# Patient Record
Sex: MALE | Race: WHITE | NOT HISPANIC OR LATINO | URBAN - METROPOLITAN AREA
[De-identification: names, ages, dates, MRNs, and addresses within clinical notes are randomized per-mention and may not be internally consistent; named-entity substitution may affect disease eponyms.]

---

## 2018-12-26 ENCOUNTER — EMERGENCY (EMERGENCY)
Facility: HOSPITAL | Age: 23
LOS: 1 days | Discharge: HOME | End: 2018-12-26
Attending: EMERGENCY MEDICINE
Payer: COMMERCIAL

## 2018-12-26 VITALS
DIASTOLIC BLOOD PRESSURE: 89 MMHG | HEART RATE: 89 BPM | TEMPERATURE: 98 F | OXYGEN SATURATION: 98 % | HEIGHT: 72 IN | SYSTOLIC BLOOD PRESSURE: 160 MMHG | WEIGHT: 169.98 LBS | RESPIRATION RATE: 18 BRPM

## 2018-12-26 PROCEDURE — 73562 X-RAY EXAM OF KNEE 3: CPT

## 2018-12-26 PROCEDURE — 99283 EMERGENCY DEPT VISIT LOW MDM: CPT | Mod: 25

## 2018-12-26 PROCEDURE — 99053 MED SERV 10PM-8AM 24 HR FAC: CPT

## 2018-12-26 PROCEDURE — 99283 EMERGENCY DEPT VISIT LOW MDM: CPT

## 2018-12-26 NOTE — ED PROVIDER NOTE - MUSCULOSKELETAL MINIMAL EXAM
LLE: +minimal swelling noted to medial left knee with +overlying ttp along medial joint line and MCL. No patella tenderness. No proximal fibular tenderness. No joint laxity. Negative Lachman's. Negative Malini's. +pain reported with valgus stress. ROM at knee limited 2/2 pain, able to flex leg at knee to about 45 degrees before reporting pain in medial aspect. Able to fully extend. Full AROM at hip and ankle with 5/5 strength.

## 2018-12-26 NOTE — ED PROVIDER NOTE - RESPIRATORY, MLM
Breath sounds clear and equal bilaterally. No wheezing, rales or rhonchi noted. No chest wall tenderness.
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
## 2018-12-26 NOTE — ED PROVIDER NOTE - PLAN OF CARE
1. Follow up with your PMD in the next 1-2 days. Additionally please follow up with either out sports medicine clinic or an orthopedist this week for further outpatient management and possible further workup.  Referral list provided.   2. Rest, ice and elevate knee. Use ACE wrap as instructed to the knee during the day. Take Motrin 600mg every 8 hrs for pain with food. For additional pain relief take Tylenol 650mg 1 tab every 4-6 hours as needed.  3. If you develop any worsening pain, swelling, redness, fever/chills, weakness, numbness/tingling, inability to walk or any other concerning symptoms please return to the ED immediately.

## 2018-12-26 NOTE — ED PROVIDER NOTE - PROGRESS NOTE DETAILS
Pt was offered analgesia at time of initial ED evaluation but refused. Has ice pack in place and wishes to just use that for pain. Will reassess need for analgesia. - KAYLIE WetzelC xray negative for fx or dislocation. Small knee joint effusion. Offered patient ACE wrap vs knee immobilizer and he requested ace wrap. offered crutches to weight bear as tolerated but refused crutches. Will d/c with ortho follow up. Attending cleared for discharge. - Juanjose Colunga PA-C
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
## 2018-12-26 NOTE — ED PROVIDER NOTE - NSFOLLOWUPCLINICS_GEN_ALL_ED_FT
[facility] Sports Medicine  Sports Medicine  [address] [place] [address]  Phone: [phone]  Fax:   Follow Up Time: 4-6 Days

## 2018-12-26 NOTE — ED PROVIDER NOTE - CARE PLAN
Principal Discharge DX:	Knee effusion, left  Assessment and plan of treatment:	1. Follow up with your PMD in the next 1-2 days. Additionally please follow up with either out sports medicine clinic or an orthopedist this week for further outpatient management and possible further workup.  Referral list provided.   2. Rest, ice and elevate knee. Use ACE wrap as instructed to the knee during the day. Take Motrin 600mg every 8 hrs for pain with food. For additional pain relief take Tylenol 650mg 1 tab every 4-6 hours as needed.  3. If you develop any worsening pain, swelling, redness, fever/chills, weakness, numbness/tingling, inability to walk or any other concerning symptoms please return to the ED immediately.

## 2018-12-27 PROCEDURE — 73562 X-RAY EXAM OF KNEE 3: CPT | Mod: 26,LT
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
## 2018-12-27 NOTE — ED ADULT NURSE NOTE - CHPI ED NUR SYMPTOMS NEG
no decreased eating/drinking/no fussiness/no disorientation/no dizziness/no sleeping issues/no difficulty bearing weight/no back pain/no bruising/no acting out behaviors/no headache/no loss of consciousness/no neck tenderness/no crying/no laceration

## 2023-04-18 ENCOUNTER — EMERGENCY (EMERGENCY)
Facility: HOSPITAL | Age: 28
LOS: 1 days | Discharge: HOME | End: 2023-04-18
Payer: COMMERCIAL

## 2023-04-18 VITALS
WEIGHT: 179.9 LBS | RESPIRATION RATE: 18 BRPM | OXYGEN SATURATION: 98 % | SYSTOLIC BLOOD PRESSURE: 141 MMHG | HEIGHT: 71 IN | DIASTOLIC BLOOD PRESSURE: 83 MMHG | TEMPERATURE: 98 F | HEART RATE: 98 BPM

## 2023-04-18 PROCEDURE — 99284 EMERGENCY DEPT VISIT MOD MDM: CPT

## 2023-04-18 NOTE — ED ADULT TRIAGE NOTE - CHIEF COMPLAINT QUOTE
trauma to left eye tonight, hit with soccer ball. Endorses left eye "went black" denies visual changes in triage. Denies dizziness, headache.

## 2023-04-19 VITALS
DIASTOLIC BLOOD PRESSURE: 68 MMHG | HEART RATE: 66 BPM | TEMPERATURE: 98 F | RESPIRATION RATE: 18 BRPM | OXYGEN SATURATION: 99 % | SYSTOLIC BLOOD PRESSURE: 122 MMHG

## 2023-04-19 PROCEDURE — 99284 EMERGENCY DEPT VISIT MOD MDM: CPT | Mod: 25

## 2023-04-19 PROCEDURE — 96374 THER/PROPH/DIAG INJ IV PUSH: CPT

## 2023-04-19 PROCEDURE — 70481 CT ORBIT/EAR/FOSSA W/DYE: CPT | Mod: 26,MA

## 2023-04-19 PROCEDURE — 96375 TX/PRO/DX INJ NEW DRUG ADDON: CPT

## 2023-04-19 PROCEDURE — 70481 CT ORBIT/EAR/FOSSA W/DYE: CPT | Mod: MA

## 2023-04-19 RX ORDER — ACETAMINOPHEN 500 MG
1000 TABLET ORAL ONCE
Refills: 0 | Status: COMPLETED | OUTPATIENT
Start: 2023-04-19 | End: 2023-04-19

## 2023-04-19 RX ORDER — ONDANSETRON 8 MG/1
4 TABLET, FILM COATED ORAL ONCE
Refills: 0 | Status: COMPLETED | OUTPATIENT
Start: 2023-04-19 | End: 2023-04-19

## 2023-04-19 RX ORDER — SODIUM CHLORIDE 9 MG/ML
1000 INJECTION INTRAMUSCULAR; INTRAVENOUS; SUBCUTANEOUS ONCE
Refills: 0 | Status: COMPLETED | OUTPATIENT
Start: 2023-04-19 | End: 2023-04-19

## 2023-04-19 RX ORDER — PREDNISOLONE SODIUM PHOSPHATE 1 %
1 DROPS OPHTHALMIC (EYE) ONCE
Refills: 0 | Status: DISCONTINUED | OUTPATIENT
Start: 2023-04-19 | End: 2023-04-22

## 2023-04-19 RX ORDER — CYCLOPENTOLATE HYDROCHLORIDE 10 MG/ML
1 SOLUTION/ DROPS OPHTHALMIC
Qty: 1 | Refills: 0
Start: 2023-04-19 | End: 2023-04-23

## 2023-04-19 RX ORDER — CYCLOPENTOLATE HYDROCHLORIDE 10 MG/ML
1 SOLUTION/ DROPS OPHTHALMIC ONCE
Refills: 0 | Status: DISCONTINUED | OUTPATIENT
Start: 2023-04-19 | End: 2023-04-22

## 2023-04-19 RX ORDER — PREDNISOLONE SODIUM PHOSPHATE 1 %
1 DROPS OPHTHALMIC (EYE)
Qty: 1 | Refills: 0
Start: 2023-04-19 | End: 2023-04-23

## 2023-04-19 RX ADMIN — ONDANSETRON 4 MILLIGRAM(S): 8 TABLET, FILM COATED ORAL at 03:15

## 2023-04-19 RX ADMIN — Medication 400 MILLIGRAM(S): at 03:05

## 2023-04-19 RX ADMIN — SODIUM CHLORIDE 1000 MILLILITER(S): 9 INJECTION INTRAMUSCULAR; INTRAVENOUS; SUBCUTANEOUS at 03:15

## 2023-04-19 RX ADMIN — Medication 1000 MILLIGRAM(S): at 04:00

## 2023-04-19 NOTE — ED PROVIDER NOTE - PHYSICAL EXAMINATION
Paul Gotti DO (PGY1)   Physical Exam:    Gen: NAD, AOx3  Head: NCAT  HEENT: EOMI, PEERLA, Visual Acuity: OS 20/50, OD 20/70 uncorrected, large corneal abrasion with injected cornea, elongated left pupil, negative Goyo's sign. Pressure in right eye 14, left eye 13.   Lung: CTAB, no respiratory distress, no wheezes/rhonchi/rales B/L  CV: RRR, no murmurs, rubs or gallops  Abd: soft, NT, ND, no guarding, no rigidity, no rebound tenderness, no CVA tenderness   MSK: no visible deformities, ROM normal in UE/LE, no back pain  Neuro: No focal sensory or motor deficits  Skin: Warm, well perfused, no rash, no leg swelling

## 2023-04-19 NOTE — ED PROVIDER NOTE - NSFOLLOWUPINSTRUCTIONS_ED_ALL_ED_FT
1. Follow up with your PCP within 2-3 days. Follow up with [facility] Department of Ophthalmology within 1 week of after discharge at:    [address]  [place] [address]  [phone]    2. Start taking Pred Forte 1 drop four times a day to left eye for 5 days. Start taking Cyclogyl 1 drop twice daily to left eye for 5 days. Use artifical tears for comfort to left eye as directed.     3. Take Ibuprofen (i.e. Motrin, Advil) 600mg every 8 hrs for pain as needed. Take with food.   May alternate with Acetminophen (Tylenol) 650mg every 6 hours for pain as needed.    4. Return to the emergency department if you have worsening pain, difficulty seeing, headaches, fevers or any other concerning symptoms.

## 2023-04-19 NOTE — ED POST DISCHARGE NOTE - ADDITIONAL DOCUMENTATION
Greg PA- pharmacy called, only can get cyclogyl 1% not 0.5%, spoke with ophtho that is ok at the same instructions BID. made the change over the phone with pharmacist.

## 2023-04-19 NOTE — CONSULT NOTE ADULT - ASSESSMENT
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 CVF full. EOM full - no optic nerve dysfunction  - Left pupil irregular, likely secondary to iris sphincter tears  - Cornea clear  - 2+ cell in AC  - DFE wnl  - Start Pred forte qid OS   - Start cyclogyl BID OS  - Artificial tears for comfort PRN    Outpatient Follow-up: Patient should follow-up with his/her ophthalmologist or with [facility] Department of Ophthalmology within 1 week of after discharge at:    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 CVF full. EOM full - no optic nerve dysfunction  - Left pupil irregular, likely secondary to iris sphincter tears  - Cornea clear  - 2+ cell in AC  - DFE wnl  - Start Pred forte qid OS   - Start cyclogyl BID OS  - Artificial tears for comfort PRN    #Commotioe Retinae with retinal hemorrhages OS  - No retinal tears, holes, detachments identified  - Bullous in some areas  -Will refer o retina specialist    Outpatient Follow-up: Patient should follow-up with his/her ophthalmologist or with [facility] Department of Ophthalmology within 1 week of after discharge at:    [address]  [place] [address]  [phone]    Ijeoma Dye MD PGY 2  Available on [employer] Teams

## 2023-04-19 NOTE — ED PROVIDER NOTE - PROGRESS NOTE DETAILS
Paul Gotti DO (PGY1)  Consulted ophthalmology regarding case.  Discussed all findings on eye exam.  Patient still pending CT of the orbits with IV contrast.  We will touch base with ophthalmology once CT read is back. Patient received on sign out. CT scan negative for acute findings. Called and spoke to ophtho they are on their way to see patient in the ED. Mckenzie Krause PA-C

## 2023-04-19 NOTE — ED PROVIDER NOTE - ATTENDING CONTRIBUTION TO CARE
I, Rommel Berrios, performed a history and physical exam of the patient and discussed their management with the resident and/or advanced care provider. I reviewed the resident and/or advanced care provider's note and agree with the documented findings and plan of care. I was present and available for all procedures.    27-year-old male no past medical history presenting with left eye trauma.  Patient states he was playing soccer and was kicked in the left eye with a soccer ball.  Patient endorsing he had vision loss during the event for about 1min No loss of consciousness.  Patient states he wears contacts normally and is was wearing them during the event.  Denies taking any medications prior to arrival. Endorsing nausea and x1 episode of nonbloody nonbilious vomiting. Denies any chest pain, shortness of breath, fevers, chills, abdominal pain, nausea vomiting diarrhea, urinary complaints. va dec now and co nausea    Well appearing and in NAD trachea midline, no respiratory distress, lungs cta bilaterally, rrr no murmurs, soft NT ND abdomen, no visible extremity deformities, Alert and oriented, non focal neuro exam, skin warm and dry, normal affect and mood    1. Visual acuity 20/50 od 20/70 os  2. Pupils with anisocoria, has normal reactivity  3. Extraocular motility and alignment intact pain w Shock demotion to superior and lateral fields of os   4. Intraocular pressure- tonometry 13 OD 15 OS  5. External examination, Slight swelling to superior eyelid with some tenderness palpation of the globe on OS normal OD     Lids/lashes/lacrimal system: Normal anatomy and contours     Conjunctiva/sclera: White and quiet  OD Injection OS     Cornea: Clear       Iris: os pupil appearing slightly distorted from 1-3 oclock      fluorecin stain  os +corneal abrasions, no heriberto sign      Concerning for KEISHA injury with tenderness as well as corneal injection with decreased visual acuity and also anisocoria pupil is reactive and without Heriberto sign but does have corneal abrasion we will treat with antibiotics otherwise evaluate for posterior injury with CT orbits discussed with patient agreeable with plan antiemetics and pain medication as necessary as well as ophthalmology evaluation for further examination and recommendations.  Unlikely retrobulbar hematoma without proptosis or elevated pressures continue to monitor and possible repeat exam if symptoms changing

## 2023-04-19 NOTE — ED PROVIDER NOTE - OBJECTIVE STATEMENT
7-year-old male no past medical history presenting with left eye trauma.  Patient states he was playing soccer and was kicked in the left eye with a ball.  Patient endorsing he had vision loss during the event.  No loss of consciousness.  Patient states he wears contacts normally and is was wearing them during the event.  Denies taking any medications prior to arrival. Endorsing nausea and x1 episode of nonbloody nonbilious vomiting. Denies any chest pain, shortness of breath, fevers, chills, abdominal pain, nausea vomiting diarrhea, urinary complaints.

## 2023-04-19 NOTE — CONSULT NOTE ADULT - SUBJECTIVE AND OBJECTIVE BOX
[facility] DEPARTMENT OF OPHTHALMOLOGY - INITIAL ADULT CONSULT  -----------------------------------------------------------------------------------------------------------------  Ijeoma Dye MD PGY 1  Contact via [employer] Teams  -----------------------------------------------------------------------------------------------------------------    HPI:    Interval History: ***    PAST MEDICAL & SURGICAL HISTORY:    Past Ocular History: ***    Family History:  FAMILY HISTORY:      Social History: ***    Ophthalmic Medications: ***    MEDICATIONS  (STANDING):    MEDICATIONS  (PRN):    Allergies & Intolerances:   amoxicillin (Stomach Upset)    Review of Systems:  Constitutional: No fever, chills  Eyes: No blurry vision, flashes, floaters, FBS, erythema, discharge, double vision, OU  Neuro: No tremors  Cardiovascular: No chest pain, palpitations  Respiratory: No SOB, no cough  GI: No nausea, vomiting, abdominal pain  : No dysuria  Skin: no rash  Psych: no depression  Endocrine: no polyuria, polydipsia  Heme/lymph: no swelling    VITALS: T(C): 36.4 (04-19-23 @ 03:00)  T(F): 97.6 (04-19-23 @ 03:00), Max: 97.7 (04-18-23 @ 23:39)  HR: 60 (04-19-23 @ 03:00) (60 - 98)  BP: 109/60 (04-19-23 @ 03:00) (109/60 - 141/83)  RR:  (16 - 18)  SpO2:  (98% - 100%)  Wt(kg): --  General: AAO x 3, appropriate mood and affect    Ophthalmology Exam:  Visual acuity (sc): 20/20 OD, 20/25 OS.  Pupils: OD: round, reactive. OS: slightly irregular superiorly. reactive. NO rapd  Tonometry:  14 OD, 16 OS  Extraocular movements (EOMs): Full OU, no pain, no diplopia.  Confrontational Visual Field (CVF): Full OU.  Color Plates: 12/12 OU.    Slit lamp exam  External: Normal OU.  Lids/Lashes/Lacrimal Ducts: Flat OD. OS: upper lid edema.   Sclera/Conjunctiva: White and quiet OD. OS: 1+ injection  Cornea: Clear OU  Anterior Chamber: Deep and quiet OD. OS: 2+ mixed cell.   Iris: Flat OU.  Lens: Clear OU.    Fundus Exam: dilated with 1% tropicamide and 2.5% phenylephrine  Approval obtained from primary team for dilation  Patient aware that pupils can remained dilated for at least 4-6 hours.  Exam performed with 20 D lens    Vitreous: wnl OU  Disc, cup/disc: sharp and pink, 0.4 OU  Macula: wnl OU  Vessels: wnl OU  Periphery: wnl OU    Labs/Imaging:  < from: CT Orbit w/ IV Cont (04.19.23 @ 06:29) >    FINDINGS:    PERIORBITAL SOFT TISSUES: Within normal limits. No significant swelling   or radiopaque foreign body. No abnormal enhancement.  ORBITAL SOFT TISSUES: The retrobulbar fat is preserved. The globes are   symmetric in size and contour. The extraocular muscles remain symmetric.   The superior ophthalmic veins are symmetric. No radiopaque foreign body.  ORBITAL RIMS: Intact.  NASAL SEPTUM: Intact.  PARANASAL SINUSES: Within normal limits.  MISCELLANEOUS: The visualized intracranial compartment is unremarkable.   The nasopharynx is symmetric.    IMPRESSION:  Unremarkable contrast-enhanced orbital CT. No evidence of posttraumatic   sequela.    --- End of Report ---    < end of copied text >     [facility] DEPARTMENT OF OPHTHALMOLOGY - INITIAL ADULT CONSULT  -----------------------------------------------------------------------------------------------------------------  Ijeoma Dye MD PGY 1  Contact via [employer] Teams  -----------------------------------------------------------------------------------------------------------------    HPI:    Interval History: ***    PAST MEDICAL & SURGICAL HISTORY:    Past Ocular History: ***    Family History:  FAMILY HISTORY:      Social History: ***    Ophthalmic Medications: ***    MEDICATIONS  (STANDING):    MEDICATIONS  (PRN):    Allergies & Intolerances:   amoxicillin (Stomach Upset)    Review of Systems:  Constitutional: No fever, chills  Eyes: No blurry vision, flashes, floaters, FBS, erythema, discharge, double vision, OU  Neuro: No tremors  Cardiovascular: No chest pain, palpitations  Respiratory: No SOB, no cough  GI: No nausea, vomiting, abdominal pain  : No dysuria  Skin: no rash  Psych: no depression  Endocrine: no polyuria, polydipsia  Heme/lymph: no swelling    VITALS: T(C): 36.4 (04-19-23 @ 03:00)  T(F): 97.6 (04-19-23 @ 03:00), Max: 97.7 (04-18-23 @ 23:39)  HR: 60 (04-19-23 @ 03:00) (60 - 98)  BP: 109/60 (04-19-23 @ 03:00) (109/60 - 141/83)  RR:  (16 - 18)  SpO2:  (98% - 100%)  Wt(kg): --  General: AAO x 3, appropriate mood and affect    Ophthalmology Exam:  Visual acuity (sc): 20/20 OD, 20/30+2 OS.  Pupils: OD: round, reactive. OS: slightly irregular superiorly. reactive. NO rapd  Tonometry:  14 OD, 16 OS  Extraocular movements (EOMs): Full OU, no pain, no diplopia.  Confrontational Visual Field (CVF): Full OU.  Color Plates: 12/12 OU.    Slit lamp exam  External: Normal OU.  Lids/Lashes/Lacrimal Ducts: Flat OD. OS: upper lid edema.   Sclera/Conjunctiva: White and quiet OD. OS: 1+ injection  Cornea: Clear OU  Anterior Chamber: Deep and quiet OD. OS: 2+ mixed cell.   Iris: Flat OU.  Lens: Clear OU.    Fundus Exam: dilated with 1% tropicamide and 2.5% phenylephrine  Approval obtained from primary team for dilation  Patient aware that pupils can remained dilated for at least 4-6 hours.  Exam performed with 20 D lens    Vitreous: wnl OU  Disc, cup/disc: sharp and pink, 0.4 OU  Macula: wnl OD. OS: commotioe with macular edema  Vessels: wnl OU  Periphery: wnl OD. OS: Far peripheral DBH in bed of commotioe superiorly and temporally. Lattice superiorly and inferiorly OU    Labs/Imaging:  < from: CT Orbit w/ IV Cont (04.19.23 @ 06:29) >    FINDINGS:    PERIORBITAL SOFT TISSUES: Within normal limits. No significant swelling   or radiopaque foreign body. No abnormal enhancement.  ORBITAL SOFT TISSUES: The retrobulbar fat is preserved. The globes are   symmetric in size and contour. The extraocular muscles remain symmetric.   The superior ophthalmic veins are symmetric. No radiopaque foreign body.  ORBITAL RIMS: Intact.  NASAL SEPTUM: Intact.  PARANASAL SINUSES: Within normal limits.  MISCELLANEOUS: The visualized intracranial compartment is unremarkable.   The nasopharynx is symmetric.    IMPRESSION:  Unremarkable contrast-enhanced orbital CT. No evidence of posttraumatic   sequela.    --- End of Report ---    < end of copied text >     [facility] DEPARTMENT OF OPHTHALMOLOGY - INITIAL ADULT CONSULT  -----------------------------------------------------------------------------------------------------------------  Ijeoma Dye MD PGY 2  Contact via [employer] Teams  -----------------------------------------------------------------------------------------------------------------    HPI: Per ED  27-year-old male no past medical history presenting with left eye trauma.  Patient states he was playing soccer and was kicked in the left eye with a ball.  Patient endorsing he had vision loss during the event.  No loss of consciousness.  Patient states he wears contacts normally and is was wearing them during the event.  Denies taking any medications prior to arrival. Endorsing nausea and x1 episode of nonbloody nonbilious vomiting. Denies any chest pain, shortness of breath, fevers, chills, abdominal pain, nausea vomiting diarrhea, urinary complaints.    Interval History: States he saw black after his injury but this subsequently resolved. At this time has mild eye pain. Denies flashes/floaters/curtains.    PAST MEDICAL & SURGICAL HISTORY:    Past Ocular History: None    Family History:  FAMILY HISTORY:      MEDICATIONS  (STANDING):    MEDICATIONS  (PRN):    Allergies & Intolerances:   amoxicillin (Stomach Upset)    Review of Systems:  Constitutional: No fever, chills  Eyes: No blurry vision, flashes, floaters, FBS, erythema, discharge, double vision, OU  Neuro: No tremors  Cardiovascular: No chest pain, palpitations  Respiratory: No SOB, no cough  GI: No nausea, vomiting, abdominal pain  : No dysuria  Skin: no rash  Psych: no depression  Endocrine: no polyuria, polydipsia  Heme/lymph: no swelling    VITALS: T(C): 36.4 (04-19-23 @ 03:00)  T(F): 97.6 (04-19-23 @ 03:00), Max: 97.7 (04-18-23 @ 23:39)  HR: 60 (04-19-23 @ 03:00) (60 - 98)  BP: 109/60 (04-19-23 @ 03:00) (109/60 - 141/83)  RR:  (16 - 18)  SpO2:  (98% - 100%)  Wt(kg): --  General: AAO x 3, appropriate mood and affect    Ophthalmology Exam:  Visual acuity (sc): 20/20 OD, 20/30+2 OS.  Pupils: OD: round, reactive. OS: slightly irregular superiorly. reactive. NO rapd  Tonometry:  14 OD, 16 OS  Extraocular movements (EOMs): Full OU, no pain, no diplopia.  Confrontational Visual Field (CVF): Full OU.  Color Plates: 12/12 OU.    Slit lamp exam  External: Normal OU.  Lids/Lashes/Lacrimal Ducts: Flat OD. OS: upper lid edema.   Sclera/Conjunctiva: White and quiet OD. OS: 1+ injection  Cornea: Clear OU  Anterior Chamber: Deep and quiet OD. OS: 2+ mixed cell.   Iris: Flat OU.  Lens: Clear OU.    Fundus Exam: dilated with 1% tropicamide and 2.5% phenylephrine  Approval obtained from primary team for dilation  Patient aware that pupils can remained dilated for at least 4-6 hours.  Exam performed with 20 D lens    Vitreous: wnl OU  Disc, cup/disc: sharp and pink, 0.4 OU  Macula: wnl OD. OS: commotioe with macular edema  Vessels: wnl OU  Periphery: wnl OD. OS: Far peripheral DBH in bed of commotioe superiorly and temporally. Lattice superiorly and inferiorly OU    Labs/Imaging:  < from: CT Orbit w/ IV Cont (04.19.23 @ 06:29) >    FINDINGS:    PERIORBITAL SOFT TISSUES: Within normal limits. No significant swelling   or radiopaque foreign body. No abnormal enhancement.  ORBITAL SOFT TISSUES: The retrobulbar fat is preserved. The globes are   symmetric in size and contour. The extraocular muscles remain symmetric.   The superior ophthalmic veins are symmetric. No radiopaque foreign body.  ORBITAL RIMS: Intact.  NASAL SEPTUM: Intact.  PARANASAL SINUSES: Within normal limits.  MISCELLANEOUS: The visualized intracranial compartment is unremarkable.   The nasopharynx is symmetric.    IMPRESSION:  Unremarkable contrast-enhanced orbital CT. No evidence of posttraumatic   sequela.    --- End of Report ---    < end of copied text >

## 2023-04-19 NOTE — ED PROVIDER NOTE - PATIENT PORTAL LINK FT
You can access the FollowMyHealth Patient Portal offered by [facility] by registering at the following website: [email or web address]. By joining [facility] [employer]’s FollowMyHealth portal, you will also be able to view your health information using other applications (apps) compatible with our system.

## 2023-04-19 NOTE — ED ADULT NURSE REASSESSMENT NOTE - NS ED NURSE REASSESS COMMENT FT1
Received report from ANITA Baez. Pt is A&Ox3, breathing spontaneously, unlabored and speaking in full sentences on RA, medications given as ordered. Pt safety and comfort measures provided.

## 2023-04-20 ENCOUNTER — APPOINTMENT (OUTPATIENT)
Dept: OPHTHALMOLOGY | Facility: CLINIC | Age: 28
End: 2023-04-20
Payer: COMMERCIAL

## 2023-04-20 ENCOUNTER — APPOINTMENT (OUTPATIENT)
Dept: OPHTHALMOLOGY | Facility: CLINIC | Age: 28
End: 2023-04-20

## 2023-04-20 ENCOUNTER — NON-APPOINTMENT (OUTPATIENT)
Age: 28
End: 2023-04-20

## 2023-04-20 PROCEDURE — 99199 UNLISTED SPECIAL SVC PX/RPRT: CPT | Mod: NC
